# Patient Record
Sex: FEMALE | Race: WHITE | HISPANIC OR LATINO
[De-identification: names, ages, dates, MRNs, and addresses within clinical notes are randomized per-mention and may not be internally consistent; named-entity substitution may affect disease eponyms.]

---

## 2019-07-09 PROBLEM — R87.619 ABNORMAL PAP SMEAR OF CERVIX: Status: ACTIVE | Noted: 2019-07-09

## 2019-07-09 PROBLEM — E28.2 PCOS (POLYCYSTIC OVARIAN SYNDROME): Status: ACTIVE | Noted: 2019-07-09

## 2020-01-28 PROBLEM — E11.65 UNCONTROLLED TYPE 2 DIABETES MELLITUS WITH HYPERGLYCEMIA (HCC): Status: ACTIVE | Noted: 2020-01-28

## 2022-11-28 ENCOUNTER — EXTERNAL LAB (OUTPATIENT)
Dept: HEALTH INFORMATION MANAGEMENT | Facility: OTHER | Age: 27
End: 2022-11-28

## 2022-11-28 LAB — LAB RESULT: NORMAL

## 2024-03-05 LAB
ABO + RH BLD: NORMAL
AMB EXT TREPONEMAL ANTIBODIES: NON REACTIVE
ANTIBODY SCREEN OB: NEGATIVE
HBV SURFACE AG SER QL: NEGATIVE
HEPATITIS B SURFACE ANTIGEN OB: NEGATIVE
HIV 1+2 AB+HIV1 P24 AG SERPL QL IA: NONREACTIVE
HIV RESULT OB: NEGATIVE
RUBV IGG SERPL IA-ACNC: POSITIVE
TREPONEMA PALLIDUM IGG/IGM AB (SYPGM): NONREACTIVE

## 2024-06-27 LAB
AMB EXT TREPONEMAL ANTIBODIES: NON REACTIVE
HIV 1+2 AB+HIV1 P24 AG SERPL QL IA: NONREACTIVE
HIV RESULT OB: NEGATIVE
TREPONEMA PALLIDUM IGG/IGM AB (SYPGM): NONREACTIVE

## 2024-07-11 ENCOUNTER — HOSPITAL ENCOUNTER (OUTPATIENT)
Age: 29
Discharge: HOME | End: 2024-07-11
Attending: OBSTETRICS & GYNECOLOGY | Admitting: OBSTETRICS & GYNECOLOGY

## 2024-07-11 VITALS
TEMPERATURE: 98.6 F | WEIGHT: 209 LBS | BODY MASS INDEX: 42.13 KG/M2 | RESPIRATION RATE: 16 BRPM | HEIGHT: 59 IN | SYSTOLIC BLOOD PRESSURE: 94 MMHG | HEART RATE: 88 BPM | OXYGEN SATURATION: 98 % | DIASTOLIC BLOOD PRESSURE: 63 MMHG

## 2024-07-11 PROCEDURE — 99202 OFFICE O/P NEW SF 15 MIN: CPT

## 2024-07-11 RX ORDER — LEVOTHYROXINE SODIUM 0.03 MG/1
37.5 TABLET ORAL DAILY
COMMUNITY

## 2024-07-11 RX ORDER — ASPIRIN 81 MG/1
81 TABLET, CHEWABLE ORAL DAILY
COMMUNITY

## 2024-07-11 RX ORDER — 0.9 % SODIUM CHLORIDE 0.9 %
2 VIAL (ML) INJECTION EVERY 12 HOURS SCHEDULED
Status: DISCONTINUED | OUTPATIENT
Start: 2024-07-11 | End: 2024-07-11 | Stop reason: HOSPADM

## 2024-07-11 RX ORDER — VITAMIN A ACETATE, BETA CAROTENE, ASCORBIC ACID, CHOLECALCIFEROL, .ALPHA.-TOCOPHEROL ACETATE, DL-, THIAMINE MONONITRATE, RIBOFLAVIN, NIACINAMIDE, PYRIDOXINE HYDROCHLORIDE, FOLIC ACID, CYANOCOBALAMIN, CALCIUM CARBONATE, FERROUS FUMARATE, ZINC OXIDE, CUPRIC OXIDE 3080; 12; 120; 400; 1; 1.84; 3; 20; 22; 920; 25; 200; 27; 10; 2 [IU]/1; UG/1; MG/1; [IU]/1; MG/1; MG/1; MG/1; MG/1; MG/1; [IU]/1; MG/1; MG/1; MG/1; MG/1; MG/1
1 TABLET, FILM COATED ORAL DAILY
COMMUNITY

## 2024-07-11 SDOH — SOCIAL STABILITY: SOCIAL INSECURITY: HOW OFTEN DOES ANYONE, INCLUDING FAMILY AND FRIENDS, THREATEN YOU WITH HARM?: NEVER

## 2024-07-11 SDOH — SOCIAL STABILITY: SOCIAL INSECURITY: HOW OFTEN DOES ANYONE, INCLUDING FAMILY AND FRIENDS, PHYSICALLY HURT YOU?: NEVER

## 2024-07-11 SDOH — SOCIAL STABILITY: SOCIAL INSECURITY: HOW OFTEN DOES ANYONE, INCLUDING FAMILY AND FRIENDS, INSULT OR TALK DOWN TO YOU?: NEVER

## 2024-07-11 SDOH — SOCIAL STABILITY: SOCIAL INSECURITY: HOW OFTEN DOES ANYONE, INCLUDING FAMILY AND FRIENDS, SCREAM OR CURSE AT YOU?: NEVER

## 2024-07-11 ASSESSMENT — PAIN SCALES - GENERAL
PAINLEVEL_OUTOF10: 0
PAINLEVEL: 0 - NO PAIN

## 2024-08-05 ENCOUNTER — HOSPITAL ENCOUNTER (OUTPATIENT)
Age: 29
Discharge: HOME | End: 2024-08-05
Attending: OBSTETRICS & GYNECOLOGY | Admitting: OBSTETRICS & GYNECOLOGY

## 2024-08-05 VITALS
DIASTOLIC BLOOD PRESSURE: 50 MMHG | SYSTOLIC BLOOD PRESSURE: 98 MMHG | BODY MASS INDEX: 42.13 KG/M2 | HEART RATE: 81 BPM | HEIGHT: 59 IN | OXYGEN SATURATION: 99 % | RESPIRATION RATE: 17 BRPM | WEIGHT: 209 LBS | TEMPERATURE: 97.9 F

## 2024-08-05 PROCEDURE — 99212 OFFICE O/P EST SF 10 MIN: CPT

## 2024-08-05 RX ORDER — INSULIN GLARGINE 100 [IU]/ML
30 INJECTION, SOLUTION SUBCUTANEOUS NIGHTLY
COMMUNITY

## 2024-08-05 RX ORDER — INSULIN GLARGINE-YFGN 100 [IU]/ML
37 INJECTION, SOLUTION SUBCUTANEOUS EVERY MORNING
COMMUNITY
Start: 2024-07-12

## 2024-08-05 SDOH — SOCIAL STABILITY: SOCIAL INSECURITY: HOW OFTEN DOES ANYONE, INCLUDING FAMILY AND FRIENDS, PHYSICALLY HURT YOU?: NEVER

## 2024-08-05 SDOH — SOCIAL STABILITY: SOCIAL INSECURITY: HOW OFTEN DOES ANYONE, INCLUDING FAMILY AND FRIENDS, INSULT OR TALK DOWN TO YOU?: NEVER

## 2024-08-05 SDOH — SOCIAL STABILITY: SOCIAL INSECURITY: HOW OFTEN DOES ANYONE, INCLUDING FAMILY AND FRIENDS, SCREAM OR CURSE AT YOU?: NEVER

## 2024-08-05 SDOH — SOCIAL STABILITY: SOCIAL INSECURITY: HOW OFTEN DOES ANYONE, INCLUDING FAMILY AND FRIENDS, THREATEN YOU WITH HARM?: NEVER

## 2024-08-20 LAB — STREP GP B CULT OB: POSITIVE

## 2024-09-02 ENCOUNTER — LAB ENCOUNTER (OUTPATIENT)
Dept: LAB | Facility: HOSPITAL | Age: 29
End: 2024-09-02
Attending: STUDENT IN AN ORGANIZED HEALTH CARE EDUCATION/TRAINING PROGRAM
Payer: COMMERCIAL

## 2024-09-02 DIAGNOSIS — Z01.818 PREOP EXAMINATION: Primary | ICD-10-CM

## 2024-09-02 LAB
ANTIBODY SCREEN: NEGATIVE
BASOPHILS # BLD AUTO: 0.02 X10(3) UL (ref 0–0.2)
BASOPHILS NFR BLD AUTO: 0.3 %
DEPRECATED RDW RBC AUTO: 44.5 FL (ref 35.1–46.3)
EOSINOPHIL # BLD AUTO: 0.08 X10(3) UL (ref 0–0.7)
EOSINOPHIL NFR BLD AUTO: 1 %
ERYTHROCYTE [DISTWIDTH] IN BLOOD BY AUTOMATED COUNT: 13.8 % (ref 11–15)
HCT VFR BLD AUTO: 37.9 %
HGB BLD-MCNC: 12.7 G/DL
IMM GRANULOCYTES # BLD AUTO: 0.04 X10(3) UL (ref 0–1)
IMM GRANULOCYTES NFR BLD: 0.5 %
LYMPHOCYTES # BLD AUTO: 1.75 X10(3) UL (ref 1–4)
LYMPHOCYTES NFR BLD AUTO: 22 %
MCH RBC QN AUTO: 29.5 PG (ref 26–34)
MCHC RBC AUTO-ENTMCNC: 33.5 G/DL (ref 31–37)
MCV RBC AUTO: 88.1 FL
MONOCYTES # BLD AUTO: 0.39 X10(3) UL (ref 0.1–1)
MONOCYTES NFR BLD AUTO: 4.9 %
NEUTROPHILS # BLD AUTO: 5.67 X10 (3) UL (ref 1.5–7.7)
NEUTROPHILS # BLD AUTO: 5.67 X10(3) UL (ref 1.5–7.7)
NEUTROPHILS NFR BLD AUTO: 71.3 %
PLATELET # BLD AUTO: 174 10(3)UL (ref 150–450)
RBC # BLD AUTO: 4.3 X10(6)UL
RH BLOOD TYPE: POSITIVE
WBC # BLD AUTO: 8 X10(3) UL (ref 4–11)

## 2024-09-02 PROCEDURE — 86900 BLOOD TYPING SEROLOGIC ABO: CPT

## 2024-09-02 PROCEDURE — 86901 BLOOD TYPING SEROLOGIC RH(D): CPT

## 2024-09-02 PROCEDURE — 86850 RBC ANTIBODY SCREEN: CPT

## 2024-09-02 PROCEDURE — 85025 COMPLETE CBC W/AUTO DIFF WBC: CPT

## 2024-09-02 PROCEDURE — 36415 COLL VENOUS BLD VENIPUNCTURE: CPT

## 2024-09-03 ENCOUNTER — TELEPHONE (OUTPATIENT)
Dept: OBGYN UNIT | Facility: HOSPITAL | Age: 29
End: 2024-09-03

## 2024-09-03 VITALS — WEIGHT: 221 LBS | BODY MASS INDEX: 44.55 KG/M2 | HEIGHT: 59 IN

## 2024-09-03 RX ORDER — INSULIN ASPART 100 [IU]/ML
12 INJECTION, SOLUTION INTRAVENOUS; SUBCUTANEOUS
COMMUNITY
End: 2024-09-06

## 2024-09-03 RX ORDER — CHOLECALCIFEROL (VITAMIN D3) 25 MCG
1 TABLET,CHEWABLE ORAL DAILY
COMMUNITY

## 2024-09-03 RX ORDER — LEVOTHYROXINE SODIUM 50 UG/1
50 TABLET ORAL
COMMUNITY

## 2024-09-04 ENCOUNTER — HOSPITAL ENCOUNTER (INPATIENT)
Facility: HOSPITAL | Age: 29
LOS: 2 days | Discharge: HOME | End: 2024-09-06
Attending: OBSTETRICS & GYNECOLOGY | Admitting: OBSTETRICS & GYNECOLOGY
Payer: COMMERCIAL

## 2024-09-04 ENCOUNTER — ANESTHESIA EVENT (OUTPATIENT)
Dept: OBGYN UNIT | Facility: HOSPITAL | Age: 29
End: 2024-09-04
Payer: COMMERCIAL

## 2024-09-04 ENCOUNTER — ANESTHESIA (OUTPATIENT)
Dept: OBGYN UNIT | Facility: HOSPITAL | Age: 29
End: 2024-09-04
Payer: COMMERCIAL

## 2024-09-04 DIAGNOSIS — Z98.891 S/P CESAREAN SECTION: Primary | ICD-10-CM

## 2024-09-04 PROBLEM — Z34.90 TERM PREGNANCY (HCC): Status: ACTIVE | Noted: 2024-09-04

## 2024-09-04 LAB
GLUCOSE BLDC GLUCOMTR-MCNC: 107 MG/DL (ref 70–99)
GLUCOSE BLDC GLUCOMTR-MCNC: 108 MG/DL (ref 70–99)
GLUCOSE BLDC GLUCOMTR-MCNC: 110 MG/DL (ref 70–99)
GLUCOSE BLDC GLUCOMTR-MCNC: 86 MG/DL (ref 70–99)
GLUCOSE BLDC GLUCOMTR-MCNC: 94 MG/DL (ref 70–99)
RH BLOOD TYPE: POSITIVE
T PALLIDUM AB SER QL IA: NONREACTIVE

## 2024-09-04 PROCEDURE — 82962 GLUCOSE BLOOD TEST: CPT

## 2024-09-04 PROCEDURE — S0028 INJECTION, FAMOTIDINE, 20 MG: HCPCS | Performed by: OBSTETRICS & GYNECOLOGY

## 2024-09-04 PROCEDURE — 88307 TISSUE EXAM BY PATHOLOGIST: CPT | Performed by: OBSTETRICS & GYNECOLOGY

## 2024-09-04 PROCEDURE — 86780 TREPONEMA PALLIDUM: CPT | Performed by: OBSTETRICS & GYNECOLOGY

## 2024-09-04 RX ORDER — METOCLOPRAMIDE HYDROCHLORIDE 5 MG/ML
10 INJECTION INTRAMUSCULAR; INTRAVENOUS ONCE
Status: COMPLETED | OUTPATIENT
Start: 2024-09-04 | End: 2024-09-04

## 2024-09-04 RX ORDER — LEVOTHYROXINE SODIUM 50 UG/1
50 TABLET ORAL
Status: DISCONTINUED | OUTPATIENT
Start: 2024-09-04 | End: 2024-09-06

## 2024-09-04 RX ORDER — DIPHENHYDRAMINE HYDROCHLORIDE 50 MG/ML
12.5 INJECTION INTRAMUSCULAR; INTRAVENOUS EVERY 4 HOURS PRN
Status: ACTIVE | OUTPATIENT
Start: 2024-09-04 | End: 2024-09-05

## 2024-09-04 RX ORDER — KETOROLAC TROMETHAMINE 30 MG/ML
30 INJECTION, SOLUTION INTRAMUSCULAR; INTRAVENOUS ONCE
Status: COMPLETED | OUTPATIENT
Start: 2024-09-04 | End: 2024-09-04

## 2024-09-04 RX ORDER — METOCLOPRAMIDE 10 MG/1
10 TABLET ORAL ONCE
Status: COMPLETED | OUTPATIENT
Start: 2024-09-04 | End: 2024-09-04

## 2024-09-04 RX ORDER — EPHEDRINE SULFATE 50 MG/ML
INJECTION INTRAVENOUS AS NEEDED
Status: DISCONTINUED | OUTPATIENT
Start: 2024-09-04 | End: 2024-09-04 | Stop reason: SURG

## 2024-09-04 RX ORDER — ACETAMINOPHEN 500 MG
1000 TABLET ORAL ONCE
Status: COMPLETED | OUTPATIENT
Start: 2024-09-04 | End: 2024-09-04

## 2024-09-04 RX ORDER — ENOXAPARIN SODIUM 100 MG/ML
40 INJECTION SUBCUTANEOUS EVERY 12 HOURS SCHEDULED
Status: DISCONTINUED | OUTPATIENT
Start: 2024-09-04 | End: 2024-09-06

## 2024-09-04 RX ORDER — IBUPROFEN 600 MG/1
600 TABLET, FILM COATED ORAL EVERY 6 HOURS
Status: DISCONTINUED | OUTPATIENT
Start: 2024-09-05 | End: 2024-09-06

## 2024-09-04 RX ORDER — DEXAMETHASONE SODIUM PHOSPHATE 4 MG/ML
VIAL (ML) INJECTION AS NEEDED
Status: DISCONTINUED | OUTPATIENT
Start: 2024-09-04 | End: 2024-09-04 | Stop reason: SURG

## 2024-09-04 RX ORDER — AMMONIA INHALANTS 0.04 G/.3ML
0.3 INHALANT RESPIRATORY (INHALATION) AS NEEDED
Status: DISCONTINUED | OUTPATIENT
Start: 2024-09-04 | End: 2024-09-06

## 2024-09-04 RX ORDER — SODIUM CHLORIDE, SODIUM LACTATE, POTASSIUM CHLORIDE, CALCIUM CHLORIDE 600; 310; 30; 20 MG/100ML; MG/100ML; MG/100ML; MG/100ML
125 INJECTION, SOLUTION INTRAVENOUS CONTINUOUS
Status: DISCONTINUED | OUTPATIENT
Start: 2024-09-04 | End: 2024-09-04 | Stop reason: HOSPADM

## 2024-09-04 RX ORDER — ACETAMINOPHEN 325 MG/1
650 TABLET ORAL EVERY 6 HOURS PRN
Status: ACTIVE | OUTPATIENT
Start: 2024-09-04 | End: 2024-09-05

## 2024-09-04 RX ORDER — ONDANSETRON 2 MG/ML
4 INJECTION INTRAMUSCULAR; INTRAVENOUS EVERY 6 HOURS PRN
Status: DISCONTINUED | OUTPATIENT
Start: 2024-09-04 | End: 2024-09-06

## 2024-09-04 RX ORDER — DIPHENHYDRAMINE HCL 25 MG
25 CAPSULE ORAL EVERY 4 HOURS PRN
Status: ACTIVE | OUTPATIENT
Start: 2024-09-04 | End: 2024-09-05

## 2024-09-04 RX ORDER — FAMOTIDINE 20 MG/1
20 TABLET, FILM COATED ORAL ONCE
Status: COMPLETED | OUTPATIENT
Start: 2024-09-04 | End: 2024-09-04

## 2024-09-04 RX ORDER — HYDROMORPHONE HYDROCHLORIDE 1 MG/ML
0.4 INJECTION, SOLUTION INTRAMUSCULAR; INTRAVENOUS; SUBCUTANEOUS
Status: ACTIVE | OUTPATIENT
Start: 2024-09-04 | End: 2024-09-05

## 2024-09-04 RX ORDER — SODIUM CHLORIDE 9 MG/ML
INJECTION, SOLUTION INTRAVENOUS CONTINUOUS PRN
Status: DISCONTINUED | OUTPATIENT
Start: 2024-09-04 | End: 2024-09-04 | Stop reason: SURG

## 2024-09-04 RX ORDER — HYDROMORPHONE HYDROCHLORIDE 1 MG/ML
0.3 INJECTION, SOLUTION INTRAMUSCULAR; INTRAVENOUS; SUBCUTANEOUS EVERY 2 HOUR PRN
Status: DISCONTINUED | OUTPATIENT
Start: 2024-09-04 | End: 2024-09-06

## 2024-09-04 RX ORDER — OXYCODONE HYDROCHLORIDE 5 MG/1
5 TABLET ORAL EVERY 6 HOURS PRN
Status: DISCONTINUED | OUTPATIENT
Start: 2024-09-04 | End: 2024-09-06

## 2024-09-04 RX ORDER — SODIUM CHLORIDE, SODIUM LACTATE, POTASSIUM CHLORIDE, CALCIUM CHLORIDE 600; 310; 30; 20 MG/100ML; MG/100ML; MG/100ML; MG/100ML
INJECTION, SOLUTION INTRAVENOUS CONTINUOUS
OUTPATIENT
Start: 2024-09-04

## 2024-09-04 RX ORDER — MORPHINE SULFATE 1 MG/ML
INJECTION, SOLUTION EPIDURAL; INTRATHECAL; INTRAVENOUS
Status: COMPLETED | OUTPATIENT
Start: 2024-09-04 | End: 2024-09-04

## 2024-09-04 RX ORDER — ONDANSETRON 2 MG/ML
4 INJECTION INTRAMUSCULAR; INTRAVENOUS ONCE AS NEEDED
Status: COMPLETED | OUTPATIENT
Start: 2024-09-04 | End: 2024-09-04

## 2024-09-04 RX ORDER — PROCHLORPERAZINE EDISYLATE 5 MG/ML
5 INJECTION INTRAMUSCULAR; INTRAVENOUS ONCE AS NEEDED
Status: COMPLETED | OUTPATIENT
Start: 2024-09-04 | End: 2024-09-04

## 2024-09-04 RX ORDER — CITRIC ACID/SODIUM CITRATE 334-500MG
30 SOLUTION, ORAL ORAL ONCE
Status: COMPLETED | OUTPATIENT
Start: 2024-09-04 | End: 2024-09-04

## 2024-09-04 RX ORDER — NALBUPHINE HYDROCHLORIDE 10 MG/ML
2.5 INJECTION, SOLUTION INTRAMUSCULAR; INTRAVENOUS; SUBCUTANEOUS EVERY 4 HOURS PRN
Status: ACTIVE | OUTPATIENT
Start: 2024-09-04 | End: 2024-09-05

## 2024-09-04 RX ORDER — BUPIVACAINE HYDROCHLORIDE 7.5 MG/ML
INJECTION, SOLUTION INTRASPINAL
Status: COMPLETED | OUTPATIENT
Start: 2024-09-04 | End: 2024-09-04

## 2024-09-04 RX ORDER — ACETAMINOPHEN 500 MG
1000 TABLET ORAL EVERY 6 HOURS
Status: DISCONTINUED | OUTPATIENT
Start: 2024-09-04 | End: 2024-09-06

## 2024-09-04 RX ORDER — KETOROLAC TROMETHAMINE 30 MG/ML
30 INJECTION, SOLUTION INTRAMUSCULAR; INTRAVENOUS EVERY 6 HOURS
Status: COMPLETED | OUTPATIENT
Start: 2024-09-04 | End: 2024-09-05

## 2024-09-04 RX ORDER — HYDROMORPHONE HYDROCHLORIDE 1 MG/ML
0.6 INJECTION, SOLUTION INTRAMUSCULAR; INTRAVENOUS; SUBCUTANEOUS
Status: ACTIVE | OUTPATIENT
Start: 2024-09-04 | End: 2024-09-05

## 2024-09-04 RX ORDER — HYDROCODONE BITARTRATE AND ACETAMINOPHEN 7.5; 325 MG/1; MG/1
1 TABLET ORAL EVERY 6 HOURS PRN
Status: ACTIVE | OUTPATIENT
Start: 2024-09-04 | End: 2024-09-05

## 2024-09-04 RX ORDER — BISACODYL 10 MG
10 SUPPOSITORY, RECTAL RECTAL ONCE AS NEEDED
Status: DISCONTINUED | OUTPATIENT
Start: 2024-09-04 | End: 2024-09-06

## 2024-09-04 RX ORDER — DOCUSATE SODIUM 100 MG/1
100 CAPSULE, LIQUID FILLED ORAL
Status: DISCONTINUED | OUTPATIENT
Start: 2024-09-04 | End: 2024-09-06

## 2024-09-04 RX ORDER — ONDANSETRON 2 MG/ML
4 INJECTION INTRAMUSCULAR; INTRAVENOUS EVERY 6 HOURS PRN
Status: DISCONTINUED | OUTPATIENT
Start: 2024-09-04 | End: 2024-09-04 | Stop reason: HOSPADM

## 2024-09-04 RX ORDER — HYDROCODONE BITARTRATE AND ACETAMINOPHEN 7.5; 325 MG/1; MG/1
2 TABLET ORAL EVERY 6 HOURS PRN
Status: ACTIVE | OUTPATIENT
Start: 2024-09-04 | End: 2024-09-05

## 2024-09-04 RX ORDER — FAMOTIDINE 10 MG/ML
20 INJECTION, SOLUTION INTRAVENOUS ONCE
Status: COMPLETED | OUTPATIENT
Start: 2024-09-04 | End: 2024-09-04

## 2024-09-04 RX ORDER — NALBUPHINE HYDROCHLORIDE 10 MG/ML
2.5 INJECTION, SOLUTION INTRAMUSCULAR; INTRAVENOUS; SUBCUTANEOUS
Status: DISCONTINUED | OUTPATIENT
Start: 2024-09-04 | End: 2024-09-06

## 2024-09-04 RX ORDER — HALOPERIDOL 5 MG/ML
0.5 INJECTION INTRAMUSCULAR ONCE AS NEEDED
Status: ACTIVE | OUTPATIENT
Start: 2024-09-04 | End: 2024-09-04

## 2024-09-04 RX ORDER — KETOROLAC TROMETHAMINE 30 MG/ML
30 INJECTION, SOLUTION INTRAMUSCULAR; INTRAVENOUS EVERY 6 HOURS
Status: DISCONTINUED | OUTPATIENT
Start: 2024-09-04 | End: 2024-09-04

## 2024-09-04 RX ORDER — SIMETHICONE 80 MG
80 TABLET,CHEWABLE ORAL 3 TIMES DAILY PRN
Status: DISCONTINUED | OUTPATIENT
Start: 2024-09-04 | End: 2024-09-06

## 2024-09-04 RX ORDER — ONDANSETRON 2 MG/ML
INJECTION INTRAMUSCULAR; INTRAVENOUS AS NEEDED
Status: DISCONTINUED | OUTPATIENT
Start: 2024-09-04 | End: 2024-09-04 | Stop reason: SURG

## 2024-09-04 RX ORDER — NALOXONE HYDROCHLORIDE 0.4 MG/ML
0.08 INJECTION, SOLUTION INTRAMUSCULAR; INTRAVENOUS; SUBCUTANEOUS
Status: ACTIVE | OUTPATIENT
Start: 2024-09-04 | End: 2024-09-05

## 2024-09-04 RX ORDER — LIDOCAINE HYDROCHLORIDE 10 MG/ML
INJECTION, SOLUTION INFILTRATION; PERINEURAL
Status: COMPLETED | OUTPATIENT
Start: 2024-09-04 | End: 2024-09-04

## 2024-09-04 RX ADMIN — BUPIVACAINE HYDROCHLORIDE 1.5 ML: 7.5 INJECTION, SOLUTION INTRASPINAL at 08:05:00

## 2024-09-04 RX ADMIN — LIDOCAINE HYDROCHLORIDE 5 ML: 10 INJECTION, SOLUTION INFILTRATION; PERINEURAL at 08:05:00

## 2024-09-04 RX ADMIN — MORPHINE SULFATE 0.3 MG: 1 INJECTION, SOLUTION EPIDURAL; INTRATHECAL; INTRAVENOUS at 08:05:00

## 2024-09-04 RX ADMIN — EPHEDRINE SULFATE 10 MG: 50 INJECTION INTRAVENOUS at 08:12:00

## 2024-09-04 RX ADMIN — SODIUM CHLORIDE: 9 INJECTION, SOLUTION INTRAVENOUS at 08:01:00

## 2024-09-04 RX ADMIN — DEXAMETHASONE SODIUM PHOSPHATE 4 MG: 4 MG/ML VIAL (ML) INJECTION at 08:27:00

## 2024-09-04 RX ADMIN — ONDANSETRON 4 MG: 2 INJECTION INTRAMUSCULAR; INTRAVENOUS at 08:27:00

## 2024-09-04 NOTE — PROGRESS NOTES
Pt is a 29 year old female admitted to TR5/TR5-A.     Chief Complaint   Patient presents with    Scheduled      Suspected macrosomia      Pt is  38w1d intra-uterine pregnancy.  History obtained, consents signed. Oriented to room, staff, and plan of care.

## 2024-09-04 NOTE — PROGRESS NOTES
Patient transferred to mother/baby room 369 per cart in stable condition with baby and personal belongings.  Accompanied by  and staff.  Report given to GERALDO Grimes mother/baby RN.

## 2024-09-04 NOTE — ANESTHESIA PREPROCEDURE EVALUATION
Anesthesia PreOp Note    HPI:     Malka Chery is a 29 year old female who presents for preoperative consultation requested by: Jaqueline Jane MD    Date of Surgery: 2024    Procedure(s):   SECTION  Indication: primary dr ferrer    Relevant Problems   No relevant active problems       NPO:  Last Liquid Consumption Date: 24  Last Liquid Consumption Time:   Last Solid Consumption Date: 24  Last Solid Consumption Time:   Last Liquid Consumption Date: 24          History Review:  Patient Active Problem List    Diagnosis Date Noted    Term pregnancy (HCC) 2024    Uncontrolled type 2 diabetes mellitus with hyperglycemia (HCC) 2020    PCOS (polycystic ovarian syndrome) 2019    Abnormal Pap smear of cervix 2019       Past Medical History:    Diabetes ([facility])    Dysplasia of cervix    PCOS (polycystic ovarian syndrome)       Past Surgical History:   Procedure Laterality Date    Colposcopy,bx cervix/endocerv curr         Medications Prior to Admission   Medication Sig Dispense Refill Last Dose    prenatal vitamin with DHA 27-0.8-228 MG Oral Cap Take 1 capsule by mouth daily.   9/3/2024    levothyroxine 50 MCG Oral Tab Take 1 tablet (50 mcg total) by mouth before breakfast.   9/3/2024    insulin aspart 100 Units/mL Injection Solution Inject 12 Units into the skin 3 (three) times daily before meals.   9/3/2024    insulin glargine 100 UNIT/ML Subcutaneous Solution Pen-injector Inject 30 Units into the skin nightly. 37 units in morning   9/3/2024    Clindamycin Phosphate (CLEOCIN) 100 MG Vaginal Suppos Place 1 suppository (100 mg total) vaginally nightly. 3 suppository 0     Clobetasol Propionate 0.05 % External Ointment Place a thin layer twice a day as needed. 60 g 0     dapagliflozin Propanediol (FARXIGA) 10 MG Oral Tab Take 1 tab once daily 90 tablet 1     metFORMIN HCl 500 MG Oral Tab Take 2 tabs twice daily 360 tablet 1     Continuous Blood Gluc Sensor (FREESTYLE  URBANO 14 DAY SENSOR) Does not apply Misc Use for 14 days 6 each 1     Continuous Blood Gluc  (FREESTYLE URBANO READER) Does not apply Device Scan every 8 hours 1 Device 0      Current Facility-Administered Medications Ordered in Epic   Medication Dose Route Frequency Provider Last Rate Last Admin    lactated ringers infusion  125 mL/hr Intravenous Continuous Hillary Gonzalez MD        ondansetron (Zofran) 4 MG/2ML injection 4 mg  4 mg Intravenous Q6H PRN Hillary Gonzalez MD        oxyTOCIN in sodium chloride 0.9% (Pitocin) 30 Units/500mL infusion premix  62.5-900 lela-units/min Intravenous Continuous Hillary Gonzalez MD        ceFAZolin (Ancef) 2g in 10mL IV syringe premix  2 g Intravenous Once Hillary Gonzalez MD         No current [facility]-ordered outpatient medications on file.       No Known Allergies    Family History   Problem Relation Age of Onset    Other (Other) Father         low BP    Diabetes Mother     No Known Problems Maternal Grandmother     Cancer Maternal Grandfather         lung cancer    Diabetes Paternal Grandmother     No Known Problems Sister     No Known Problems Brother      Social History     Socioeconomic History    Marital status:    Tobacco Use    Smoking status: Never    Smokeless tobacco: Never   Vaping Use    Vaping status: Never Used   Substance and Sexual Activity    Alcohol use: Yes     Alcohol/week: 1.0 - 2.0 standard drink of alcohol     Types: 1 - 2 Standard drinks or equivalent per week    Drug use: Never    Sexual activity: Yes     Partners: Male       Available pre-op labs reviewed.  Lab Results   Component Value Date    WBC 8.0 09/02/2024    RBC 4.30 09/02/2024    HGB 12.7 09/02/2024    HCT 37.9 09/02/2024    MCV 88.1 09/02/2024    MCH 29.5 09/02/2024    MCHC 33.5 09/02/2024    RDW 13.8 09/02/2024    .0 09/02/2024     Lab Results   Component Value Date    PGLU 94 09/04/2024          Vital Signs:  Body mass index is 44.64 kg/m².   weight is 100.2 kg (221  lb). Her oral temperature is 98.2 °F (36.8 °C). Her blood pressure is 127/64 and her pulse is 70. Her respiration is 16.   Vitals:    09/04/24 0545 09/04/24 0552   BP: 127/64    Pulse: 70    Resp: 16    Temp: 98.2 °F (36.8 °C)    TempSrc: Oral    Weight:  100.2 kg (221 lb)        Anesthesia Evaluation     Patient summary reviewed and Nursing notes reviewed    Airway   Mallampati: I  TM distance: >3 FB  Neck ROM: full  Dental - Dentition appears grossly intact     Pulmonary - negative ROS and normal exam   Cardiovascular - negative ROS and normal exam    Neuro/Psych - negative ROS     GI/Hepatic/Renal - negative ROS     Endo/Other    (+) diabetes mellitus  Abdominal  - normal exam                 Anesthesia Plan:   ASA:  2  Plan:   Spinal  Post-op Pain Management: Intrathecal narcotics and IV analgesics  Plan Comments: Possible general  Informed Consent Plan and Risks Discussed With:  Patient  Use of Blood Products Discussed With:  Patient      I have informed Malka Chery and/or legal guardian or family member of the nature of the anesthetic plan, benefits, risks including possible dental damage if relevant, major complications, and any alternative forms of anesthetic management.   All of the patient's questions were answered to the best of my ability. The patient desires the anesthetic management as planned.  RADHIKA GARZA MD  9/4/2024 7:20 AM  Present on Admission:  **None**

## 2024-09-04 NOTE — ANESTHESIA PROCEDURE NOTES
Spinal Block    Date/Time: 9/4/2024 8:05 AM    Performed by: Ric Portillo MD  Authorized by: Ric Portillo MD      General Information and Staff    Start Time:  9/4/2024 8:05 AM  End Time:  9/4/2024 8:08 AM  Anesthesiologist:  Ric Portillo MD  Performed by:  Anesthesiologist  Patient Location:  OR  Site identification: surface landmarks    Reason for Block: post-op pain management and surgical anesthesia    Preanesthetic Checklist: patient identified, IV checked, risks and benefits discussed, monitors and equipment checked, pre-op evaluation, timeout performed, anesthesia consent and sterile technique used      Procedure Details    Patient Position:  Sitting  Prep: ChloraPrep    Monitoring:  Cardiac monitor  Approach:  Midline  Location:  L4-5  Injection Technique:  Single-shot    Needle    Needle Type:  Pencil-tip  Needle Gauge:  24 G  Needle Length:  3.5 in    Assessment    Sensory Level:  T6  Events: clear CSF, CSF aspirated, well tolerated and blood negative      Additional Comments     First pass at 9 cm

## 2024-09-04 NOTE — BRIEF OP NOTE
[facility]  part of [facility]     Section Delivery Note    Malka Chery Patient Status:  Inpatient    1995 MRN [id number]   Location [facility] Attending Hillary Gonzalez MD   Hosp Day # 0 PCP No primary care provider on file.     Pre Op Diagnosis:  primary elective   Post Op Diagnosis: same as pre-op  Procedure:   Surgical/Procedural Cases on this Admission       Case IDs Date Procedure Surgeon Location Status    [id number] 24  SECTION Hillary Gonzalez MD [facility] L+D OR [facility]          LT    Surgeon:  Hillary Gonzalez MD  Assistant Surgeon:  Jaqueline Goetz The involvement of the assisting physician was necessary in order to provide aid in exposure/retraction, hemostasis, closure, and other intraoperative technical functions in order to facilitate me as the primary surgeon carry out a safe operation with optimized results/outcome.    Anesthesia: spinal  Complications: none  Neonatologist Present: yes  Findings: normal uterus, normal tubes, and normal ovaries    Delivery     Infant  Date of Delivery: 2024    Time of Delivery: 8:24 AM   Delivery Type: Caesarean Section     Infant Sex/Birthweight: male No birth weight on file.     Presentation Vertex [1]   Position          Apgars:  1 minute:                 5 minutes:                          10 minutes:      Placenta  Date/Time of Delivery:     Delivery: spontaneous  Placenta to Pathology: yes  Cord Gases Submitted: yes  Cord Blood Collection: yes  Cord Tissue Collection:no   Cord Complications: none  Sponge and Needle Counts:  Verified: yes    Delivery Information:        Information for the patient's :  Reagan Chery [[id number]]   @[id number]@      Patient: Malka Chery 29 year old female        Procedure:  After discussing risks, benefits and alternatives, informed consent was obtained from the patient.  Patient was taken to the operating room.  With the patient in the supine  position, the abdomen was prepped and draped in the usual sterile fashion.  A Pfannenstiel skin incision was made and extended through the subcutaneous tissue to the fascia.  The fascia was incised transversely.  The underlying muscles were sharply and bluntly divided.  The muscles were retracted laterally.  The peritoneum was entered bluntly. The bernadette retractor was placed. A bladder flap was developed and a low transverse uterine incision was made with the knife and extended bluntly.  The infant's head was delivered.  The cord was doubly clamped and divided after a 30 second delay.  The infant was handed off to the waiting neonatologist. Cord blood was obtained.  The placenta was removed spontaneously.  The uterine cavity was explored and clear off all clots and debris.  The uterine incision was closed with a layer of running, locking 0 Vicryl suture.  Second layer of same suture was used to obtain adequate hemostasis.  The abdomen was evacuated of clots, sponges, and debris.  Bernadette retractor was removed. The peritoneum was closed with 3-0 vicryl. The fascia was closed with 0 PDS.  Plain gut subdermal sutures were placed.  Skin was closed with 3-0 vicryl.  Sterile dressing was placed.  Sponge, lap and needle count was correct times 2.  The patient was taken to the postoperative recovery room in good condition, having tolerated the procedure well.       Input/Output   EBL:  see qbl  Delivery Fluids:  Per anesthesia record   Urine Output: see delivery report  Urine Color: clear    Hillary Gonzalez MD  9/4/2024  8:59 AM

## 2024-09-04 NOTE — ANESTHESIA POSTPROCEDURE EVALUATION
Patient: Malka Cheyr    Procedure Summary       Date: 24 Room / Location: [facility] L+D OR  [facility] L+D OR    Anesthesia Start: 0800 Anesthesia Stop:     Procedure:  SECTION (Abdomen) Diagnosis: ( @ 38 1/ wks for primary C/S)    Surgeons: Hillary Gonzalez MD Anesthesiologist: Radhika Garza MD    Anesthesia Type: spinal ASA Status: 2            Anesthesia Type: spinal    Vitals Value Taken Time   /70 2402   Temp 97.6 °F (36.4 °C) 24   Pulse 58 24   Resp 14 24   SpO2 96 % 24   Vitals shown include unfiled device data.    [facility] AN Post Evaluation:   Patient Evaluated in PACU  Patient Participation: complete - patient participated  Level of Consciousness: awake  Pain Score: 0  Pain Management: adequate  Airway Patency:patent  Dental exam unchanged from preop  Yes    Cardiovascular Status: acceptable  Respiratory Status: acceptable  Postoperative Hydration acceptable      RADHIKA GARZA MD  2024 9:03 AM

## 2024-09-04 NOTE — H&P
Visit Details  Malka Chery is a 29 year old  female at 38w1d  gestation who presents with scheduled primary elective  due to large HC and AC (>95%) Her current pregnancy is significant for PCOS and T2DM    OB History    Para Term  AB Living   3 0 0 0 2 0   SAB IAB Ectopic Multiple Live Births   1 0 0 0 0       Past Medical History  Past Medical History:    Diabetes (HCC)    Dysplasia of cervix    PCOS (polycystic ovarian syndrome)       Surgical History  Past Surgical History:   Procedure Laterality Date    Colposcopy,bx cervix/endocerv curr          Social History  Social History     Socioeconomic History    Marital status:    Tobacco Use    Smoking status: Never    Smokeless tobacco: Never   Vaping Use    Vaping status: Never Used   Substance and Sexual Activity    Alcohol use: Yes     Alcohol/week: 1.0 - 2.0 standard drink of alcohol     Types: 1 - 2 Standard drinks or equivalent per week    Drug use: Never    Sexual activity: Yes     Partners: Male       Family History    Family History   Problem Relation Age of Onset    Other (Other) Father         low BP    Diabetes Mother     No Known Problems Maternal Grandmother     Cancer Maternal Grandfather         lung cancer    Diabetes Paternal Grandmother     No Known Problems Sister     No Known Problems Brother         Last Recorded Vitals  /64 (BP Location: Left arm)   Pulse 70   Temp 98.2 °F (36.8 °C) (Oral)   Resp 16   Wt 221 lb (100.2 kg)   BMI 44.64 kg/m²       Review of Systems  Review of Systems   Constitutional: Negative.    Respiratory: Negative.     Cardiovascular: Negative.    Gastrointestinal: Negative.    Skin: Negative.    Neurological: Negative.         Physical Exam  Physical Exam  Constitutional:       Appearance: Normal appearance.   HENT:      Head: Normocephalic.   Cardiovascular:      Rate and Rhythm: Normal rate and regular rhythm.   Pulmonary:      Effort: Pulmonary effort is normal.       Breath sounds: Normal breath sounds.   Abdominal:      General: There is no distension.      Palpations: Abdomen is soft.      Comments: Gravid     Musculoskeletal:         General: No swelling.   Skin:     General: Skin is warm and dry.   Neurological:      Mental Status: She is alert.   Psychiatric:         Mood and Affect: Mood normal.         Behavior: Behavior normal.         Labs    Recent Labs   Lab 24  1158   RBC 4.30   HGB 12.7   HCT 37.9   MCV 88.1   MCH 29.5   MCHC 33.5   RDW 13.8   NEPRELIM 5.67   WBC 8.0   .0           Imaging\    2024  IUP cephalic  Posterior Placenta  EFW 3302g      General:   alert, appears stated age, and cooperative   FHT:  140 BPM    Cervix:     Dilation: @FLOWINST(34533::1)@    Effacement: @FLOWINST(91831::1)@    Station:  @FLOWINST(58961::1)@    Consistency: @FLOWINST(5159::1)@    Presentation: @FLOWINST(42188::1)@    Position: @FLOWINST(69108::1)@    Woods Score: @FLOWINST(44963::1)@   Extremities: Non-tender, no edema      > Reassuring FHTs       Assessment:   29 year old @OB1@ at 38w1d presents to  Labor and Delivery for scheduled primary  section  Plan:      Admit to L&D  Procedure - r/b/a reviewed with patient including risks of bleeding, infection, injury to surrounding structures, injury to baby, need for blood transfusion, need for hysterectomy.  Prophy - ancef, scds  Dispo - admission after surgery

## 2024-09-04 NOTE — L&D DELIVERY NOTE
Reagan Chery [[id number]]      Labor Events     labor?: No   steroids?: None  Antibiotics received during labor?: Yes  Antibiotics (enter # doses in comment): cefazolin  Rupture date/time: 2024 0823     Intrapartum & labor complications: Other - see comments  Intrapartum & labor complications comment:  @ 38 1/7 wks for primary C/S        [place] Presentation    Presentation: Vertex       Operative Delivery    No data filed       Shoulder Dystocia    No data filed       Anesthesia    Method: Spinal   Analgesics:  Analgesics   DURAMORPH IJ             [place] Delivery      Head delivery date/time: 2024 08:24:36   Delivery date/time:  24 08:24:56   Delivery type: Caesarean Section    Details:   categorization: Primary    priority: scheduled   Indications for : Macrosomia   Skin incision type: 1 Pfannenstiel   Uterine Incision type: Low Transverse      Delivery location: OR       Delivery Providers    Delivering Clinician: Hillary Gonzalez MD   Delivery personnel:  Provider Role   Anna Shipman, RN Baby Nurse   Francoise Nguyen RN Delivery Nurse   Ric Portillo MD Anesthesiologist             Cord    No data filed        Measurements    No data filed       Placenta    No data filed       Apgars    No data filed       Skin to Skin    No data filed       Vaginal Count    No data filed       Lacerations    Episiotomy: None

## 2024-09-05 LAB
BASOPHILS # BLD AUTO: 0.03 X10(3) UL (ref 0–0.2)
BASOPHILS NFR BLD AUTO: 0.3 %
DEPRECATED RDW RBC AUTO: 44.9 FL (ref 35.1–46.3)
EOSINOPHIL # BLD AUTO: 0.06 X10(3) UL (ref 0–0.7)
EOSINOPHIL NFR BLD AUTO: 0.6 %
ERYTHROCYTE [DISTWIDTH] IN BLOOD BY AUTOMATED COUNT: 13.9 % (ref 11–15)
GLUCOSE BLDC GLUCOMTR-MCNC: 104 MG/DL (ref 70–99)
GLUCOSE BLDC GLUCOMTR-MCNC: 57 MG/DL (ref 70–99)
GLUCOSE BLDC GLUCOMTR-MCNC: 70 MG/DL (ref 70–99)
GLUCOSE BLDC GLUCOMTR-MCNC: 98 MG/DL (ref 70–99)
HCT VFR BLD AUTO: 29.5 %
HGB BLD-MCNC: 9.8 G/DL
IMM GRANULOCYTES # BLD AUTO: 0.05 X10(3) UL (ref 0–1)
IMM GRANULOCYTES NFR BLD: 0.5 %
LYMPHOCYTES # BLD AUTO: 2.49 X10(3) UL (ref 1–4)
LYMPHOCYTES NFR BLD AUTO: 24.5 %
MCH RBC QN AUTO: 29.5 PG (ref 26–34)
MCHC RBC AUTO-ENTMCNC: 33.2 G/DL (ref 31–37)
MCV RBC AUTO: 88.9 FL
MONOCYTES # BLD AUTO: 0.56 X10(3) UL (ref 0.1–1)
MONOCYTES NFR BLD AUTO: 5.5 %
NEUTROPHILS # BLD AUTO: 6.96 X10 (3) UL (ref 1.5–7.7)
NEUTROPHILS # BLD AUTO: 6.96 X10(3) UL (ref 1.5–7.7)
NEUTROPHILS NFR BLD AUTO: 68.6 %
PLATELET # BLD AUTO: 143 10(3)UL (ref 150–450)
RBC # BLD AUTO: 3.32 X10(6)UL
WBC # BLD AUTO: 10.2 X10(3) UL (ref 4–11)

## 2024-09-05 PROCEDURE — 85025 COMPLETE CBC W/AUTO DIFF WBC: CPT | Performed by: OBSTETRICS & GYNECOLOGY

## 2024-09-05 PROCEDURE — 82962 GLUCOSE BLOOD TEST: CPT

## 2024-09-05 NOTE — PROGRESS NOTES
[facility]  part of [facility]    OB/GYNE Progress Note      Malka Chery Patient Status:  Inpatient    1995 MRN [id number]   Location [facility] 3SE Attending Hillary Gonzalez MD   Hosp Day # 1 PCP No primary care provider on file.       Assessment/Plan       Assessment  Problems:   Patient Active Problem List   Diagnosis    PCOS (polycystic ovarian syndrome)    Abnormal Pap smear of cervix    Uncontrolled type 2 diabetes mellitus with hyperglycemia (HCC)    Term pregnancy (HCC)          POD #1  AC BS 59, will hold metformin.        Subjective       Review of Systems:  Constitutional: feeling better, pain improved, and up in bed      Objective   Vital signs in last 24 hours:  Temp:  [97.9 °F (36.6 °C)-99 °F (37.2 °C)] 98.2 °F (36.8 °C)  Pulse:  [63-95] 70  Resp:  [12-21] 18  BP: ()/(63-85) 98/63  SpO2:  [91 %-100 %] 96 %    Input/Output:    Intake/Output Summary (Last 24 hours) at 2024 0930  Last data filed at 2024 0545  Gross per 24 hour   Intake 1519.37 ml   Output 1550 ml   Net -30.63 ml       Weight (Last 6):  Wt Readings from Last 6 Encounters:   24 221 lb (100.2 kg)   24 221 lb (100.2 kg)   21 190 lb (86.2 kg)   21 190 lb (86.2 kg)   21 195 lb 6.4 oz (88.6 kg)   21 196 lb (88.9 kg)     Body mass index is 44.64 kg/m².    Constitutional: comfortable  Uterus: fundus firm and fundas appropriately tender  Wound/Incision:  dressing intact and in place  Extremities: No calf tenderness      Gómez Catheter Information  Does patient have a gómez catheter: no      Results:     Lab Results   Component Value Date    WBC 10.2 2024    HGB 9.8 2024    HCT 29.5 2024    .0 2024         No results found.    Jaqueline Goetz MD  2024  9:30 AM

## 2024-09-05 NOTE — ANESTHESIA POST-OP FOLLOW-UP NOTE
S/p c/s IT Duramorph   POD # 1, doing well presents no c/c   No HA no BA no new neurologic signs or symptoms   Site is clean dry intact  Discharged from the service

## 2024-09-05 NOTE — LACTATION NOTE
09/05/24 3224   Problems identified   Problems Identified Other LC visit attempt, MB RN states patient plan is to bottle feed formula. Encouraged to notify lactation if changes occur.

## 2024-09-06 VITALS
OXYGEN SATURATION: 98 % | BODY MASS INDEX: 45 KG/M2 | TEMPERATURE: 98 F | HEART RATE: 81 BPM | WEIGHT: 221 LBS | DIASTOLIC BLOOD PRESSURE: 80 MMHG | SYSTOLIC BLOOD PRESSURE: 123 MMHG | RESPIRATION RATE: 16 BRPM

## 2024-09-06 LAB — GLUCOSE BLDC GLUCOMTR-MCNC: 83 MG/DL (ref 70–99)

## 2024-09-06 PROCEDURE — 82962 GLUCOSE BLOOD TEST: CPT

## 2024-09-06 RX ORDER — OXYCODONE HYDROCHLORIDE 5 MG/1
5 TABLET ORAL EVERY 6 HOURS PRN
Qty: 10 TABLET | Refills: 0 | Status: SHIPPED | OUTPATIENT
Start: 2024-09-06

## 2024-09-06 NOTE — DISCHARGE SUMMARY
[facility]  part of [facility]    Discharge Summary    Malka Chery Patient Status:  Inpatient    1995 MRN [id number]   Location [facility] 3SE Attending Prema Ferrer MD   Hosp Day # 2 PCP No primary care provider on file.     Date of Admission: 2024    Admission Diagnoses: primary dr ferrer  Term pregnancy (HCC)    Date of Discharge: 24    Discharge Diagnoses:S/P  Section    Episode Diagnoses:   pregnancy Problems (from 24 to present)       No problems associated with this episode.                  Hospital Course:     EDC: Estimated Date of Delivery: 24    Gestational Age: 38w1d    Date of Delivery: 2024   Time of Delivery: 8:24 AM     Antepartum complications:  T2DM, obesity, LGA infant    Delivered By: PREMA FERRER     Delivery Method: Caesarean Section     Delivery Procedures:   Surgical Procedures       Case IDs Date Procedure Surgeon Location Status    [id number] 24  SECTION Prema Ferrer MD [facility] L+D OR [place]            Baby: male       Apgars:  1 minute:   9                  5 minutes: 9                           10 minutes:      Feeding Method:The patient is not currently breastfeeding.     Intrapartum Complications: None    Lacerations      Perineal lacerations: None      Vaginal laceration?: No      Cervical laceration?: No    Clitoral laceration?: No             Episiotomy: None     Placenta: Manual Removal     Postpartum complications: Anemia        Discharge Plan:   Discharge Condition: Good    Discharge medications:  Current Discharge Medication List        New Orders    Details   oxyCODONE 5 MG Oral Tab Take 1 tablet (5 mg total) by mouth every 6 (six) hours as needed.           Home Meds - Unchanged    Details   prenatal vitamin with DHA 27-0.8-228 MG Oral Cap Take 1 capsule by mouth daily.      levothyroxine 50 MCG Oral Tab Take 1 tablet (50 mcg total) by mouth before breakfast.      Continuous Blood Gluc  Sensor (FREESTYLE URBANO 14 DAY SENSOR) Does not apply Misc Use for 14 days      Continuous Blood Gluc  (FREESTYLE URBANO READER) Does not apply Device Scan every 8 hours                   Discharge Diet: As tolerated    Discharge Activity: Pelvic rest until cleared and no heavy lifting >10 lbs for 6 weeks    Follow up:     Follow Up in the Office: 2 weeks for follow up- incision check      Follow up with endocrinology as scheduled          Rosa Pedraza DO  9/6/2024

## 2024-09-06 NOTE — PROGRESS NOTES
[facility]  part of [facility]    OB/Gyne Post  Section Progress Note      Malka Chery Patient Status:  Inpatient    1995 MRN [id number]   Location [facility] 3SE Attending Hillary Gonzalez MD   Hosp Day # 2 PCP No primary care provider on file.       Subjective     Pt denies N/V/F/C/CP/SOB/palpitations, dizziness.      Good pain control.   Tolerating present diet.   Ambulating well.  Voiding freely.    Breastfeeding Yes   Formula Feeding: No   Tolerating Diet Yes   Flatus: Yes   BM: No       Objective   Vital signs in last 24 hours:  Temp:  [98.1 °F (36.7 °C)] 98.1 °F (36.7 °C)  Pulse:  [70-81] 81  Resp:  [16] 16  BP: (108-123)/(67-80) 123/80    Input/Output:    Intake/Output Summary (Last 24 hours) at 2024 1130  Last data filed at 2024 1234  Gross per 24 hour   Intake --   Output 400 ml   Net -400 ml       Constitutional: comfortable  Abdomen: soft; appropriately tender; non distended  Uterus: fundus at  umbilicus,   non tender  Wound/Incision:  Clean / dry / intact; no erythema and no ecchymosis  Extremities: No calf tenderness, SCDs present ,   Lochia: minimal      Results:   Labs / Path / Radiology:    Recent Results (from the past 24 hour(s))   POCT Glucose    Collection Time: 24  4:30 PM   Result Value Ref Range    POC Glucose  70 70 - 99 mg/dL   POCT Glucose    Collection Time: 24 11:04 PM   Result Value Ref Range    POC Glucose  98 70 - 99 mg/dL   POCT Glucose    Collection Time: 24  9:17 AM   Result Value Ref Range    POC Glucose  83 70 - 99 mg/dL       Specimens (From admission, onward)      None            No results found.      Assessment/Plan   POD#  3  with following issues:   Patient Active Problem List   Diagnosis    PCOS (polycystic ovarian syndrome)    Abnormal Pap smear of cervix    Uncontrolled type 2 diabetes mellitus with hyperglycemia (HCC)    Term pregnancy (HCC)   .    This is a 29 year old  who presents s/p C/S  for POD # 3     Post-Op care:  -Pt doing well overall  -Pain controlled  -Breastfeeding, lactation consultant available for assistance  discharge home with discharge instructions reviewed in detail    2. Heme:   -s/p C/S Hgb 9.8  - Acute blood loss anemia  -Asx and hemodynamically stable  -Will encourage cont PNV and increase intake of iron rich foods      3. T2DM  - BS well controlled  - hold metformin/insulin. Check BS qid  - Has appt with endo 9/17 - to discuss then    3. Disposition:  Pt comfortable about going home; will d/c home today;   Motrin/Tylenol OTC. Rx OxyIR given  Wound and home instructions given to patient and she verbalized understanding  Pt to call to make appt in 2 weeks for wound check and 6 wks postpartum  check  Pt to call if any concerns arise and make a n appointment for evaluation sooner if needed      VTE Risk    [place] VTE Risk Score:   Caprini VTE Risk Score:   Obstetric VTE Risk Score: 2         Rosa Pedraza DO  9/6/2024  11:30 AM

## 2024-09-06 NOTE — DISCHARGE INSTRUCTIONS
-Pelvic rest for 6 weeks; no sex, tampons, douching, baths, or pools until after 6 weeks check-up.  -No heavy lifting; increase activity gradually.  -No driving if taking narcotics.    CALL YOUR PROVIDER IF:  Increased/heavy bleeding (I.e. Changing a saturated pad every hour).  New onset chills/fever greater than 100.4.  Pain in your vagina or abdomen that gets worse and isn't relieved with medicine.  Swelling or discharge with a bad odor from vagina.  Burning, pain, red streaks, or lumpy areas in your breasts that may be accompanied by flu-like symptoms.  Painful urination, or inability to control urination.  Nausea, vomiting, dizziness, or fainting.  Feelings of extreme sadness or anxiety, or a feeling that you don’t want to be with your baby.  Redness, warmth, or pain in the lower leg.  Chest pain or shortness of breath.  If : Check incision site AT LEAST 2x daily for signs and symptoms of infection (increased redness, warmth, tenderness, or drainage)    Mom & Baby Hour: Meets in person every WEDNESDAY at 10am at the [facility] in [place] ([address]) in the community education room. The group is for new moms and their babies up to 6 months of age. It includes breastfeeding supports with a certified lactation educator to be available to answer your breastfeeding questions.

## 2024-09-09 NOTE — OPERATIVE REPORT
[facility]  part of [facility]      Section Delivery Note           Malka Chery Patient Status:  Inpatient    1995 MRN [id number]   Location [facility] Attending Hillary Gonzalez MD   Hosp Day # 0 PCP No primary care provider on file.      Pre Op Diagnosis:  primary elective   Post Op Diagnosis: same as pre-op  Procedure:   Surgical/Procedural Cases on this Admission         Case IDs Date Procedure Surgeon Location Status     [id number] 24  SECTION Hillary Gonzalez MD [facility] L+D OR [facility]            LT     Surgeon:  Hillary Gonzalez MD  Assistant Surgeon:  Jaqueline Goetz The involvement of the assisting physician was necessary in order to provide aid in exposure/retraction, hemostasis, closure, and other intraoperative technical functions in order to facilitate me as the primary surgeon carry out a safe operation with optimized results/outcome.     Anesthesia: spinal  Complications: none  Neonatologist Present: yes  Findings: normal uterus, normal tubes, and normal ovaries     Delivery      Infant  Date of Delivery: 2024    Time of Delivery: 8:24 AM   Delivery Type: Caesarean Section      Infant Sex/Birthweight: male No birth weight on file.      Presentation Vertex [1]   Position           Apgars:  1 minute:                 5 minutes:                          10 minutes:       Placenta  Date/Time of Delivery:     Delivery: spontaneous  Placenta to Pathology: yes  Cord Gases Submitted: yes  Cord Blood Collection: yes  Cord Tissue Collection:no   Cord Complications: none  Sponge and Needle Counts:  Verified: yes     Delivery Information:        Information for the patient's :  Reagan Chery [[id number]]   @[id number]@       Patient: Malka Chery 29 year old female        Procedure:  After discussing risks, benefits and alternatives, informed consent was obtained from the patient.  Patient was taken to the operating room.  With the  patient in the supine position, the abdomen was prepped and draped in the usual sterile fashion.  A Pfannenstiel skin incision was made and extended through the subcutaneous tissue to the fascia.  The fascia was incised transversely.  The underlying muscles were sharply and bluntly divided.  The muscles were retracted laterally.  The peritoneum was entered bluntly. The bernadette retractor was placed. A bladder flap was developed and a low transverse uterine incision was made with the knife and extended bluntly.  The infant's head was delivered.  The cord was doubly clamped and divided after a 30 second delay.  The infant was handed off to the waiting neonatologist. Cord blood was obtained.  The placenta was removed spontaneously.  The uterine cavity was explored and clear off all clots and debris.  The uterine incision was closed with a layer of running, locking 0 Vicryl suture.  Second layer of same suture was used to obtain adequate hemostasis.  The abdomen was evacuated of clots, sponges, and debris.  Bernadette retractor was removed. The peritoneum was closed with 3-0 vicryl. The fascia was closed with 0 PDS.  Plain gut subdermal sutures were placed.  Skin was closed with 3-0 vicryl.  Sterile dressing was placed.  Sponge, lap and needle count was correct times 2.  The patient was taken to the postoperative recovery room in good condition, having tolerated the procedure well.         Input/Output   EBL:  see qbl  Delivery Fluids:  Per anesthesia record   Urine Output: see delivery report  Urine Color: clear     Hillary Gonzalez MD  9/4/2024  8:59 AM

## (undated) DEVICE — BLADE SUR W37.2MM CUT H0.23MM GEN PURP

## (undated) DEVICE — SPONGE LAP 18X18IN WHT COT 4 PLY FLD STRUNG

## (undated) DEVICE — Device

## (undated) DEVICE — 3M™ MEDIPORE™ H SOFT CLOTH SURGICAL TAPE 2864, 4 INCH X 10 YARD (10CM X 9,14M), 12 ROLLS/CASE: Brand: 3M™ MEDIPORE™